# Patient Record
Sex: FEMALE | Race: BLACK OR AFRICAN AMERICAN | NOT HISPANIC OR LATINO | URBAN - METROPOLITAN AREA
[De-identification: names, ages, dates, MRNs, and addresses within clinical notes are randomized per-mention and may not be internally consistent; named-entity substitution may affect disease eponyms.]

---

## 2020-12-23 ENCOUNTER — OFFICE VISIT (OUTPATIENT)
Dept: URBAN - METROPOLITAN AREA CLINIC 70 | Facility: CLINIC | Age: 70
End: 2020-12-23

## 2021-01-11 ENCOUNTER — OFFICE VISIT (OUTPATIENT)
Dept: URBAN - METROPOLITAN AREA CLINIC 70 | Facility: CLINIC | Age: 71
End: 2021-01-11

## 2021-01-21 ENCOUNTER — OFFICE VISIT (OUTPATIENT)
Dept: URBAN - METROPOLITAN AREA CLINIC 70 | Facility: CLINIC | Age: 71
End: 2021-01-21
Payer: MEDICARE

## 2021-01-21 ENCOUNTER — LAB OUTSIDE AN ENCOUNTER (OUTPATIENT)
Dept: URBAN - METROPOLITAN AREA CLINIC 70 | Facility: CLINIC | Age: 71
End: 2021-01-21

## 2021-01-21 ENCOUNTER — WEB ENCOUNTER (OUTPATIENT)
Dept: URBAN - METROPOLITAN AREA CLINIC 70 | Facility: CLINIC | Age: 71
End: 2021-01-21

## 2021-01-21 VITALS
SYSTOLIC BLOOD PRESSURE: 135 MMHG | HEIGHT: 63 IN | HEART RATE: 74 BPM | DIASTOLIC BLOOD PRESSURE: 79 MMHG | TEMPERATURE: 95 F | WEIGHT: 149.4 LBS | BODY MASS INDEX: 26.47 KG/M2

## 2021-01-21 DIAGNOSIS — Z86.010 PERSONAL HISTORY OF COLONIC POLYP: ICD-10-CM

## 2021-01-21 DIAGNOSIS — K21.9 GERD: ICD-10-CM

## 2021-01-21 DIAGNOSIS — R09.89 GLOBUS SENSATION: ICD-10-CM

## 2021-01-21 DIAGNOSIS — R13.10 DYSPHAGIA: ICD-10-CM

## 2021-01-21 PROCEDURE — 1036F TOBACCO NON-USER: CPT | Performed by: NURSE PRACTITIONER

## 2021-01-21 PROCEDURE — 99204 OFFICE O/P NEW MOD 45 MIN: CPT | Performed by: NURSE PRACTITIONER

## 2021-01-21 PROCEDURE — 3017F COLORECTAL CA SCREEN DOC REV: CPT | Performed by: NURSE PRACTITIONER

## 2021-01-21 PROCEDURE — G8420 CALC BMI NORM PARAMETERS: HCPCS | Performed by: NURSE PRACTITIONER

## 2021-01-21 PROCEDURE — G8427 DOCREV CUR MEDS BY ELIG CLIN: HCPCS | Performed by: NURSE PRACTITIONER

## 2021-01-21 RX ORDER — OMEPRAZOLE 20 MG/1
TAKE 1 CAPSULE (20 MG) BY ORAL ROUTE TWICE DAILY BEFORE BREAKFAST AND DINNER CAPSULE, DELAYED RELEASE ORAL 1
Qty: 60 | Refills: 1 | Status: DISCONTINUED | COMMUNITY
Start: 2018-01-29

## 2021-01-21 RX ORDER — PANTOPRAZOLE SODIUM 40 MG/1
1 TABLET TABLET, DELAYED RELEASE ORAL ONCE A DAY
Qty: 90 | Refills: 0 | OUTPATIENT
Start: 2021-01-21

## 2021-01-21 NOTE — HPI-TODAY'S VISIT:
Pt presents today due to trouble with swallowing.  Reports she has sensation of something stuck in her throat all the time. Also states certain foods like bread and rice seem to go down slowly mid-esophagus if she does not chew very small bites.  Denies having to vomit food back up.  Denies dysphagia with liquids. Dysphagia has occurred gradually. Has not been taking an acid reducer. Had an EGD 1/29/18 which found a non-bleeding clean based gastric ulcer, small hiatal hernia, and gastritis.  Pathology was positive for hpylori.  Recommended recall in 8 weeks to eval for healing, but subsequent EGD has not been scheduled to date. Last colonoscopy was 11/15/19 and had poor prep.  Previous colonoscopy prior had poor prep as well. Colonoscopy 11/15/19 found a 12 mm and 5 mm TA as well evidence of prior end to end colo-colonic anastamosis of ascending colon.  Int hemorrhoids and diverticulosis observed.  Pt unsure of why she had bowel surgery.  Denies personal hx of colon cancer.  Pt was to have recall for colonoscopy in 1 yr. Reports mother with hx of colon cancer. Pt denies rectal bleeding, abnormal wt loss, or abdominal pain.

## 2021-01-27 ENCOUNTER — OFFICE VISIT (OUTPATIENT)
Dept: URBAN - METROPOLITAN AREA CLINIC 70 | Facility: CLINIC | Age: 71
End: 2021-01-27

## 2021-01-28 ENCOUNTER — CLAIMS CREATED FROM THE CLAIM WINDOW (OUTPATIENT)
Dept: URBAN - METROPOLITAN AREA CLINIC 4 | Facility: CLINIC | Age: 71
End: 2021-01-28
Payer: MEDICARE

## 2021-01-28 ENCOUNTER — TELEPHONE ENCOUNTER (OUTPATIENT)
Dept: URBAN - METROPOLITAN AREA CLINIC 70 | Facility: CLINIC | Age: 71
End: 2021-01-28

## 2021-01-28 ENCOUNTER — OFFICE VISIT (OUTPATIENT)
Dept: URBAN - METROPOLITAN AREA SURGERY CENTER 24 | Facility: SURGERY CENTER | Age: 71
End: 2021-01-28
Payer: MEDICARE

## 2021-01-28 DIAGNOSIS — K21.00 GASTRO-ESOPHAGEAL REFLUX DISEASE WITH ESOPHAGITIS, WITHOUT BLEEDING: ICD-10-CM

## 2021-01-28 DIAGNOSIS — K31.9 DISEASE OF STOMACH AND DUODENUM, UNSPECIFIED: ICD-10-CM

## 2021-01-28 DIAGNOSIS — K31.89 ACQUIRED DEFORMITY OF DUODENUM: ICD-10-CM

## 2021-01-28 DIAGNOSIS — K21.9 ACID REFLUX: ICD-10-CM

## 2021-01-28 DIAGNOSIS — K31.89 OTHER DISEASES OF STOMACH AND DUODENUM: ICD-10-CM

## 2021-01-28 PROCEDURE — 88312 SPECIAL STAINS GROUP 1: CPT | Performed by: PATHOLOGY

## 2021-01-28 PROCEDURE — G8907 PT DOC NO EVENTS ON DISCHARG: HCPCS | Performed by: INTERNAL MEDICINE

## 2021-01-28 PROCEDURE — 43239 EGD BIOPSY SINGLE/MULTIPLE: CPT | Performed by: INTERNAL MEDICINE

## 2021-01-28 PROCEDURE — 88305 TISSUE EXAM BY PATHOLOGIST: CPT | Performed by: PATHOLOGY

## 2021-01-28 RX ORDER — SUCRALFATE 1 G
1 TABLET ON AN EMPTY STOMACH TABLET ORAL TWICE A DAY
Qty: 60 TABLET | Refills: 0 | OUTPATIENT
Start: 2021-01-28 | End: 2021-02-27

## 2021-01-28 RX ORDER — PANTOPRAZOLE SODIUM 40 MG/1
1 TABLET TABLET, DELAYED RELEASE ORAL TWICE A DAY
Qty: 180 TABLET | Refills: 1 | OUTPATIENT
Start: 2021-01-21

## 2021-01-28 RX ORDER — PANTOPRAZOLE SODIUM 40 MG/1
1 TABLET TABLET, DELAYED RELEASE ORAL ONCE A DAY
Qty: 90 | Refills: 0 | Status: ACTIVE | COMMUNITY
Start: 2021-01-21

## 2021-02-15 ENCOUNTER — OFFICE VISIT (OUTPATIENT)
Dept: URBAN - METROPOLITAN AREA CLINIC 70 | Facility: CLINIC | Age: 71
End: 2021-02-15

## 2021-02-18 ENCOUNTER — OFFICE VISIT (OUTPATIENT)
Dept: URBAN - METROPOLITAN AREA SURGERY CENTER 24 | Facility: SURGERY CENTER | Age: 71
End: 2021-02-18

## 2021-03-08 ENCOUNTER — CLAIMS CREATED FROM THE CLAIM WINDOW (OUTPATIENT)
Dept: URBAN - METROPOLITAN AREA CLINIC 70 | Facility: CLINIC | Age: 71
End: 2021-03-08
Payer: MEDICARE

## 2021-03-08 VITALS
HEIGHT: 63 IN | DIASTOLIC BLOOD PRESSURE: 78 MMHG | TEMPERATURE: 96.2 F | BODY MASS INDEX: 23.71 KG/M2 | HEART RATE: 66 BPM | SYSTOLIC BLOOD PRESSURE: 142 MMHG | WEIGHT: 133.8 LBS

## 2021-03-08 DIAGNOSIS — R13.19 OTHER DYSPHAGIA: ICD-10-CM

## 2021-03-08 DIAGNOSIS — F45.8 GLOBUS SENSATION: ICD-10-CM

## 2021-03-08 PROCEDURE — 99215 OFFICE O/P EST HI 40 MIN: CPT | Performed by: NURSE PRACTITIONER

## 2021-03-08 RX ORDER — PANTOPRAZOLE SODIUM 40 MG/1
1 TABLET TABLET, DELAYED RELEASE ORAL TWICE A DAY
Qty: 180 TABLET | Refills: 1 | Status: DISCONTINUED | COMMUNITY
Start: 2021-01-21

## 2021-03-08 NOTE — HPI-TODAY'S VISIT:
Pt was here for her office visit s/p EGD as described below.  Pt was in hallway and started to fall over backwards per Radha Reynoso MA.  This provider did not witness this event  as I was in a different exam room at the time.  Per the MA, she caught the pt and lowered her to the floor.  Pt therefore did not hit her head and did not lose consciousness.  Pt observed by this provider laying on the floor talking normally without any signs of respiratory distress.  Pt repeated several times that she "keeps choking" and can't eat.  EMS was called to transport pt to ED for further evaluation.  Bp, HR, and O2 sat wnl.  Pt described coherently how she has not been able to eat or drink much lately and does not feel well. Pt's spouse retrieved from his car and brought to pt's side in office and explained to him what happened.  Spouse states pt has been able to swallow without vomiting food but that she feels like the food has a hard time going down.  States she does not eat much lately. Pt was sat up in wheelchair without problem as she continued to describe her concerns. EMS arrived for transportation of pt to ED.  Spouse at pt's side. Notified Dr Alcala as well of above incident.   Pt presents today as a f/u after EGD for dysphagia. EGD on 1/28/21 was negative for a stricture.  Found small hiatal hernia, gastritis, and granular mucoa in cardia. 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 Pt to take Protonix 40 mg po bid and Carafate 1 gm po bid. Recommended repeat EGD in 1 yr. Day of the procedure, pt called afterwards stating she felt like she was choking. Dr Alcala was notified, but pt did not have dilation and no complications during procedure. Felt that sensation was due to globus sensation. ENT to be consulted at this F/U OV.   OF NOTE LOV:  Pt presents today due to trouble with swallowing.  Reports she has sensation of something stuck in her throat all the time. Also states certain foods like bread and rice seem to go down slowly mid-esophagus if she does not chew very small bites.  Denies having to vomit food back up.  Denies dysphagia with liquids. Dysphagia has occurred gradually. Has not been taking an acid reducer. Had an EGD 1/29/18 which found a non-bleeding clean based gastric ulcer, small hiatal hernia, and gastritis.  Pathology was positive for hpylori.  Recommended recall in 8 weeks to eval for healing, but subsequent EGD has not been scheduled to date. Last colonoscopy was 11/15/19 and had poor prep.  Previous colonoscopy prior had poor prep as well. Colonoscopy 11/15/19 found a 12 mm and 5 mm TA as well evidence of prior end to end colo-colonic anastamosis of ascending colon.  Int hemorrhoids and diverticulosis observed.  Pt unsure of why she had bowel surgery.  Denies personal hx of colon cancer.  Pt was to have recall for colonoscopy in 1 yr. Reports mother with hx of colon cancer. Pt denies rectal bleeding, abnormal wt loss, or abdominal pain.

## 2021-04-06 ENCOUNTER — OFFICE VISIT (OUTPATIENT)
Dept: URBAN - METROPOLITAN AREA CLINIC 94 | Facility: CLINIC | Age: 71
End: 2021-04-06
Payer: MEDICARE

## 2021-04-06 VITALS
HEIGHT: 63 IN | BODY MASS INDEX: 22.68 KG/M2 | HEART RATE: 60 BPM | WEIGHT: 128 LBS | SYSTOLIC BLOOD PRESSURE: 127 MMHG | TEMPERATURE: 97.9 F | DIASTOLIC BLOOD PRESSURE: 75 MMHG

## 2021-04-06 DIAGNOSIS — R09.89 GLOBUS SENSATION: ICD-10-CM

## 2021-04-06 PROCEDURE — 99214 OFFICE O/P EST MOD 30 MIN: CPT | Performed by: INTERNAL MEDICINE

## 2021-04-06 RX ORDER — ALUMINUM HYDROXIDE AND MAGNESIUM CARBONATE 95; 358 MG/15ML; MG/15ML
15 ML AFTER MEALS AND AT BEDTIME AS NEEDED LIQUID ORAL
Qty: 1800 ML | Refills: 2 | OUTPATIENT
Start: 2021-04-06 | End: 2021-07-05

## 2021-04-06 RX ORDER — OMEPRAZOLE 40 MG/1
1 CAPSULE 30 MINUTES BEFORE MORNING MEAL CAPSULE, DELAYED RELEASE ORAL ONCE A DAY
Qty: 60 | Refills: 2 | OUTPATIENT
Start: 2021-04-06

## 2021-04-06 NOTE — PHYSICAL EXAM SKIN:
no rashes , no suspicious lesions , no areas of discoloration , no jaundice present , good turgor , no masses , no tenderness on palpation Statement Selected

## 2021-04-06 NOTE — HPI-TODAY'S VISIT:
71 y/o F with hx of H.pylori s/p treatment here for globus sensation  Patient reports for the last 3 months has had constant upper throat(more on the right side of the throat) sensation of tightness as though something is stuck there, worse with solid foods, enough that she is avoiding solid food. Underwent recent EGD 01/2021 without any esophageal abnormality. Is only taking carafate. Was seen at [facility] office last month  Denies any abdominal pain, nausea/vomiting, regurgitation  EGD 01/2021 with Dr Alcala: irregular Z line, small hiatal hernia, gastric erythema. Biopsies with reflux type changes at [facility](no [facility]), focal intestinal metaplasia in cardia

## 2021-04-09 PROBLEM — 14760008 CONSTIPATION: Status: ACTIVE | Noted: 2021-04-09

## 2021-04-09 PROBLEM — 428283002 HISTORY OF POLYP OF COLON: Status: ACTIVE | Noted: 2021-01-21

## 2021-04-09 PROBLEM — 40890009 ESOPHAGEAL DYSPHAGIA: Status: ACTIVE | Noted: 2021-04-09

## 2021-04-14 ENCOUNTER — OFFICE VISIT (OUTPATIENT)
Dept: URBAN - METROPOLITAN AREA MEDICAL CENTER 28 | Facility: MEDICAL CENTER | Age: 71
End: 2021-04-14

## 2021-05-04 ENCOUNTER — OFFICE VISIT (OUTPATIENT)
Dept: URBAN - METROPOLITAN AREA CLINIC 94 | Facility: CLINIC | Age: 71
End: 2021-05-04

## 2021-05-04 RX ORDER — OMEPRAZOLE 40 MG/1
1 CAPSULE 30 MINUTES BEFORE MORNING MEAL CAPSULE, DELAYED RELEASE ORAL ONCE A DAY
Qty: 60 | Refills: 2 | Status: ACTIVE | COMMUNITY
Start: 2021-04-06

## 2021-05-04 RX ORDER — ALUMINUM HYDROXIDE AND MAGNESIUM CARBONATE 95; 358 MG/15ML; MG/15ML
15 ML AFTER MEALS AND AT BEDTIME AS NEEDED LIQUID ORAL
Qty: 1800 ML | Refills: 2 | Status: ACTIVE | COMMUNITY
Start: 2021-04-06 | End: 2021-07-05

## 2021-05-17 ENCOUNTER — OFFICE VISIT (OUTPATIENT)
Dept: URBAN - METROPOLITAN AREA CLINIC 94 | Facility: CLINIC | Age: 71
End: 2021-05-17
Payer: MEDICARE

## 2021-05-17 VITALS
TEMPERATURE: 98.1 F | HEART RATE: 67 BPM | BODY MASS INDEX: 20.91 KG/M2 | HEIGHT: 63 IN | SYSTOLIC BLOOD PRESSURE: 112 MMHG | DIASTOLIC BLOOD PRESSURE: 63 MMHG | WEIGHT: 118 LBS

## 2021-05-17 DIAGNOSIS — R09.89 GLOBUS SENSATION: ICD-10-CM

## 2021-05-17 PROCEDURE — 99214 OFFICE O/P EST MOD 30 MIN: CPT | Performed by: INTERNAL MEDICINE

## 2021-05-17 RX ORDER — OMEPRAZOLE 40 MG/1
1 CAPSULE 30 MINUTES BEFORE MORNING MEAL CAPSULE, DELAYED RELEASE ORAL ONCE A DAY
Qty: 60 | Refills: 2 | OUTPATIENT

## 2021-05-17 RX ORDER — ALUMINUM HYDROXIDE AND MAGNESIUM CARBONATE 95; 358 MG/15ML; MG/15ML
15 ML AFTER MEALS AND AT BEDTIME AS NEEDED LIQUID ORAL
Qty: 1800 ML | Refills: 2 | Status: ACTIVE | COMMUNITY
Start: 2021-04-06 | End: 2021-07-05

## 2021-05-17 RX ORDER — OMEPRAZOLE 40 MG/1
1 CAPSULE 30 MINUTES BEFORE MORNING MEAL CAPSULE, DELAYED RELEASE ORAL ONCE A DAY
Qty: 60 | Refills: 2 | Status: ACTIVE | COMMUNITY
Start: 2021-04-06

## 2021-05-17 NOTE — HPI-TODAY'S VISIT:
69 y/o F with hx of H.pylori s/p treatment here for globus sensation  Patient reports since this year has had constant upper throat(more on the right side of the throat) sensation of tightness as though something is stuck there, worse with solid foods, enough that she is avoiding solid food like meats. Underwent recent EGD 01/2021 without any esophageal abnormality. CT neck 04/2021 unremarkable  Today, reports symptoms a better since she started taking this gaviscon like antacid. Did not get manometry test as they required COVID testing  Denies any abdominal pain, nausea/vomiting, regurgitation  EGD 01/2021 with Dr Alcala: irregular Z line, small hiatal hernia, gastric erythema. Biopsies with reflux type changes at [facility](no [facility]), focal intestinal metaplasia in cardia

## 2021-05-21 PROBLEM — 40739000 DYSPHAGIA: Status: ACTIVE | Noted: 2021-01-21

## 2021-05-28 ENCOUNTER — ERX REFILL RESPONSE (OUTPATIENT)
Dept: URBAN - METROPOLITAN AREA CLINIC 70 | Facility: CLINIC | Age: 71
End: 2021-05-28

## 2021-05-28 RX ORDER — SUCRALFATE 1 G/1
1 TABLET ON AN EMPTY STOMACH TABLET ORAL TWICE A DAY
Qty: 60 | Refills: 0

## 2021-05-29 ENCOUNTER — ERX REFILL RESPONSE (OUTPATIENT)
Dept: URBAN - METROPOLITAN AREA CLINIC 70 | Facility: CLINIC | Age: 71
End: 2021-05-29

## 2021-05-29 RX ORDER — SUCRALFATE 1 G/1
1 TABLET ON AN EMPTY STOMACH TABLET ORAL TWICE A DAY
Qty: 60 | Refills: 0

## 2021-06-07 ENCOUNTER — OFFICE VISIT (OUTPATIENT)
Dept: URBAN - METROPOLITAN AREA MEDICAL CENTER 28 | Facility: MEDICAL CENTER | Age: 71
End: 2021-06-07

## 2021-07-07 ENCOUNTER — LAB OUTSIDE AN ENCOUNTER (OUTPATIENT)
Dept: URBAN - METROPOLITAN AREA CLINIC 70 | Facility: CLINIC | Age: 71
End: 2021-07-07

## 2021-07-07 ENCOUNTER — OFFICE VISIT (OUTPATIENT)
Dept: URBAN - METROPOLITAN AREA CLINIC 70 | Facility: CLINIC | Age: 71
End: 2021-07-07
Payer: MEDICARE

## 2021-07-07 DIAGNOSIS — R13.19 OTHER DYSPHAGIA: ICD-10-CM

## 2021-07-07 DIAGNOSIS — R63.4 ABNORMAL WEIGHT LOSS: ICD-10-CM

## 2021-07-07 DIAGNOSIS — K21.9 GERD WITHOUT ESOPHAGITIS: ICD-10-CM

## 2021-07-07 PROBLEM — 266435005: Status: ACTIVE | Noted: 2021-07-07

## 2021-07-07 PROCEDURE — 99214 OFFICE O/P EST MOD 30 MIN: CPT | Performed by: NURSE PRACTITIONER

## 2021-07-07 RX ORDER — SUCRALFATE 1 G/1
1 TABLET ON AN EMPTY STOMACH TABLET ORAL TWICE A DAY
Qty: 60 | Refills: 0 | Status: ACTIVE | COMMUNITY

## 2021-07-07 RX ORDER — ALPRAZOLAM 0.5 MG/1
TABLET ORAL
Qty: 14 | Status: ACTIVE | COMMUNITY

## 2021-07-07 RX ORDER — OMEPRAZOLE 40 MG/1
1 CAPSULE 30 MINUTES BEFORE MORNING MEAL CAPSULE, DELAYED RELEASE ORAL ONCE A DAY
Qty: 60 | Refills: 2 | Status: ACTIVE | COMMUNITY

## 2021-07-07 RX ORDER — SUCRALFATE 1 G/10ML
10 ML ON AN EMPTY STOMACH SUSPENSION ORAL
Qty: 1200 ML | Refills: 1 | OUTPATIENT

## 2021-07-07 RX ORDER — OMEPRAZOLE 40 MG/1
1 CAPSULE 30 MINUTES BEFORE MORNING MEAL CAPSULE, DELAYED RELEASE ORAL TWICE DAILY
Qty: 180 | Refills: 2 | OUTPATIENT

## 2021-07-07 RX ORDER — SUCRALFATE 1 G/10ML
SUSPENSION ORAL
Qty: 480 | Status: ACTIVE | COMMUNITY

## 2021-07-07 RX ORDER — DICYCLOMINE HYDROCHLORIDE 20 MG/1
TABLET ORAL
Qty: 90 | Status: ACTIVE | COMMUNITY

## 2021-07-07 NOTE — HPI-TODAY'S VISIT:
Pt presents today for hiatal hernia. Of note, pt did have colonoscopy 11/15/19 with recommended repeat in 1 year due to 12mm and 5mm TA polyps resected and family hx of colon cancer. Today pt reports ongoing sensation of something being in her throat to extent she feels the need to puree her food. Feels reflux come up into her throat.   Also reports 30# weight loss.  She did weight 149# at 01/2021 Ov and now weights 118# today. Denies rectal bleeding or family hx of colon cancer. States she went to [facility] ED recently for difficulty swallowing as well.            -OF NOTE LOV 5/17/21 PER DR CLEARY:  69 y/o F with hx of H.pylori s/p treatment here for globus sensation  Patient reports since this year has had constant upper throat(more on the right side of the throat) sensation of tightness as though something is stuck there, worse with solid foods, enough that she is avoiding solid food like meats. Underwent recent EGD 01/2021 without any esophageal abnormality. CT neck 04/2021 unremarkable  Today, reports symptoms a better since she started taking this gaviscon like antacid. Did not get manometry test as they required COVID testing  Denies any abdominal pain, nausea/vomiting, regurgitation  EGD 01/2021 with Dr Alcala: irregular Z line, small hiatal hernia, gastric erythema. Biopsies with reflux type changes at [facility](no [facility]), focal intestinal metaplasia in cardia

## 2021-09-14 ENCOUNTER — TELEPHONE ENCOUNTER (OUTPATIENT)
Dept: URBAN - METROPOLITAN AREA CLINIC 70 | Facility: CLINIC | Age: 71
End: 2021-09-14

## 2021-10-25 ENCOUNTER — OFFICE VISIT (OUTPATIENT)
Dept: URBAN - METROPOLITAN AREA SURGERY CENTER 24 | Facility: SURGERY CENTER | Age: 71
End: 2021-10-25

## 2021-10-26 ENCOUNTER — OFFICE VISIT (OUTPATIENT)
Dept: URBAN - METROPOLITAN AREA CLINIC 70 | Facility: CLINIC | Age: 71
End: 2021-10-26

## 2021-10-26 RX ORDER — ALPRAZOLAM 0.5 MG/1
TABLET ORAL
Qty: 14 | Status: ACTIVE | COMMUNITY

## 2021-10-26 RX ORDER — SUCRALFATE 1 G/1
1 TABLET ON AN EMPTY STOMACH TABLET ORAL TWICE A DAY
Qty: 60 | Refills: 0 | Status: ACTIVE | COMMUNITY

## 2021-10-26 RX ORDER — DICYCLOMINE HYDROCHLORIDE 20 MG/1
TABLET ORAL
Qty: 90 | Status: ACTIVE | COMMUNITY

## 2021-10-26 RX ORDER — SUCRALFATE 1 G/10ML
10 ML ON AN EMPTY STOMACH SUSPENSION ORAL
Qty: 1200 ML | Refills: 1 | Status: ACTIVE | COMMUNITY

## 2021-10-26 RX ORDER — OMEPRAZOLE 40 MG/1
1 CAPSULE 30 MINUTES BEFORE MORNING MEAL CAPSULE, DELAYED RELEASE ORAL TWICE DAILY
Qty: 180 | Refills: 2 | Status: ACTIVE | COMMUNITY

## 2021-10-26 NOTE — HPI-OTHER HISTORIES
----Last office note from 07/07/2021 by Tayler Schofield NP: Pt presents today for hiatal hernia. Of note, pt did have colonoscopy 11/15/19 with recommended repeat in 1 year due to 12mm and 5mm TA polyps resected and family hx of colon cancer. Today pt reports ongoing sensation of something being in her throat to extent she feels the need to puree her food. Feels reflux come up into her throat.   Also reports 30# weight loss.  She did weight 149# at 01/2021 Ov and now weights 118# today. Denies rectal bleeding or family hx of colon cancer. States she went to [facility] ED recently for difficulty swallowing as well.          -OF NOTE LOV 5/17/21 PER DR CLEARY:  71 y/o F with hx of H.pylori s/p treatment here for globus sensation  Patient reports since this year has had constant upper throat(more on the right side of the throat) sensation of tightness as though something is stuck there, worse with solid foods, enough that she is avoiding solid food like meats. Underwent recent EGD 01/2021 without any esophageal abnormality. CT neck 04/2021 unremarkable  Today, reports symptoms a better since she started taking this gaviscon like antacid. Did not get manometry test as they required COVID testing  Denies any abdominal pain, nausea/vomiting, regurgitation  EGD 01/2021 with Dr Alcala: irregular Z line, small hiatal hernia, gastric erythema. Biopsies with reflux type changes at [facility](no [facility]), focal intestinal metaplasia in cardia

## 2021-11-01 ENCOUNTER — TELEPHONE ENCOUNTER (OUTPATIENT)
Dept: URBAN - METROPOLITAN AREA CLINIC 70 | Facility: CLINIC | Age: 71
End: 2021-11-01

## 2021-11-03 ENCOUNTER — CLAIMS CREATED FROM THE CLAIM WINDOW (OUTPATIENT)
Dept: URBAN - METROPOLITAN AREA CLINIC 4 | Facility: CLINIC | Age: 71
End: 2021-11-03
Payer: MEDICARE

## 2021-11-03 ENCOUNTER — OFFICE VISIT (OUTPATIENT)
Dept: URBAN - METROPOLITAN AREA SURGERY CENTER 24 | Facility: SURGERY CENTER | Age: 71
End: 2021-11-03
Payer: MEDICARE

## 2021-11-03 DIAGNOSIS — D12.0 BENIGN NEOPLASM OF CECUM: ICD-10-CM

## 2021-11-03 DIAGNOSIS — D12.0 ADENOMA OF CECUM: ICD-10-CM

## 2021-11-03 DIAGNOSIS — Z86.010 ADENOMAS PERSONAL HISTORY OF COLONIC POLYPS: ICD-10-CM

## 2021-11-03 PROCEDURE — G8907 PT DOC NO EVENTS ON DISCHARG: HCPCS | Performed by: INTERNAL MEDICINE

## 2021-11-03 PROCEDURE — 88305 TISSUE EXAM BY PATHOLOGIST: CPT | Performed by: PATHOLOGY

## 2021-11-03 PROCEDURE — 45380 COLONOSCOPY AND BIOPSY: CPT | Performed by: INTERNAL MEDICINE

## 2021-11-03 RX ORDER — SUCRALFATE 1 G/1
1 TABLET ON AN EMPTY STOMACH TABLET ORAL TWICE A DAY
Qty: 60 | Refills: 0 | Status: ACTIVE | COMMUNITY

## 2021-11-03 RX ORDER — OMEPRAZOLE 40 MG/1
1 CAPSULE 30 MINUTES BEFORE MORNING MEAL CAPSULE, DELAYED RELEASE ORAL TWICE DAILY
Qty: 180 | Refills: 2 | Status: ACTIVE | COMMUNITY

## 2021-11-03 RX ORDER — ALPRAZOLAM 0.5 MG/1
TABLET ORAL
Qty: 14 | Status: ACTIVE | COMMUNITY

## 2021-11-03 RX ORDER — SUCRALFATE 1 G/10ML
10 ML ON AN EMPTY STOMACH SUSPENSION ORAL
Qty: 1200 ML | Refills: 1 | Status: ACTIVE | COMMUNITY

## 2021-11-03 RX ORDER — DICYCLOMINE HYDROCHLORIDE 20 MG/1
TABLET ORAL
Qty: 90 | Status: ACTIVE | COMMUNITY

## 2022-07-26 ENCOUNTER — OFFICE VISIT (OUTPATIENT)
Dept: URBAN - METROPOLITAN AREA CLINIC 70 | Facility: CLINIC | Age: 72
End: 2022-07-26

## 2022-07-26 RX ORDER — SUCRALFATE 1 G/1
1 TABLET ON AN EMPTY STOMACH TABLET ORAL TWICE A DAY
Qty: 60 | Refills: 0 | Status: ACTIVE | COMMUNITY

## 2022-07-26 RX ORDER — ALPRAZOLAM 0.5 MG/1
TABLET ORAL
Qty: 14 | Status: ACTIVE | COMMUNITY

## 2022-07-26 RX ORDER — DICYCLOMINE HYDROCHLORIDE 20 MG/1
TABLET ORAL
Qty: 90 | Status: ACTIVE | COMMUNITY

## 2022-07-26 RX ORDER — SUCRALFATE 1 G/10ML
10 ML ON AN EMPTY STOMACH SUSPENSION ORAL
Qty: 1200 ML | Refills: 1 | Status: ACTIVE | COMMUNITY

## 2022-07-26 RX ORDER — OMEPRAZOLE 40 MG/1
1 CAPSULE 30 MINUTES BEFORE MORNING MEAL CAPSULE, DELAYED RELEASE ORAL TWICE DAILY
Qty: 180 | Refills: 2 | Status: ACTIVE | COMMUNITY

## 2022-08-25 ENCOUNTER — LAB OUTSIDE AN ENCOUNTER (OUTPATIENT)
Dept: URBAN - METROPOLITAN AREA CLINIC 70 | Facility: CLINIC | Age: 72
End: 2022-08-25

## 2022-08-25 ENCOUNTER — OFFICE VISIT (OUTPATIENT)
Dept: URBAN - METROPOLITAN AREA CLINIC 70 | Facility: CLINIC | Age: 72
End: 2022-08-25
Payer: MEDICARE

## 2022-08-25 VITALS
HEIGHT: 63 IN | SYSTOLIC BLOOD PRESSURE: 120 MMHG | HEART RATE: 59 BPM | WEIGHT: 108.9 LBS | BODY MASS INDEX: 19.3 KG/M2 | TEMPERATURE: 97.2 F | DIASTOLIC BLOOD PRESSURE: 69 MMHG

## 2022-08-25 DIAGNOSIS — R13.19 OTHER DYSPHAGIA: ICD-10-CM

## 2022-08-25 DIAGNOSIS — R63.4 WEIGHT LOSS: ICD-10-CM

## 2022-08-25 DIAGNOSIS — K21.00 GASTROESOPHAGEAL REFLUX DISEASE WITH ESOPHAGITIS WITHOUT HEMORRHAGE: ICD-10-CM

## 2022-08-25 PROCEDURE — 99204 OFFICE O/P NEW MOD 45 MIN: CPT | Performed by: NURSE PRACTITIONER

## 2022-08-25 RX ORDER — ALPRAZOLAM 0.5 MG/1
TABLET ORAL
Qty: 14 | Status: ACTIVE | COMMUNITY

## 2022-08-25 RX ORDER — OMEPRAZOLE 20 MG/1
TAKE 1 CAPSULE TABLET, DELAYED RELEASE ORAL TWICE A DAY
Qty: 180 | Refills: 1

## 2022-08-25 RX ORDER — SUCRALFATE 1 G/1
1 TABLET ON AN EMPTY STOMACH TABLET ORAL TWICE A DAY
Qty: 60 | Refills: 0 | Status: DISCONTINUED | COMMUNITY

## 2022-08-25 RX ORDER — OMEPRAZOLE 40 MG/1
1 CAPSULE 30 MINUTES BEFORE MORNING MEAL CAPSULE, DELAYED RELEASE ORAL TWICE DAILY
Qty: 180 | Refills: 2 | Status: ACTIVE | COMMUNITY

## 2022-08-25 RX ORDER — SUCRALFATE 1 G/10ML
10 ML ON AN EMPTY STOMACH SUSPENSION ORAL
Qty: 1200 ML | Refills: 1 | Status: ACTIVE | COMMUNITY

## 2022-08-25 RX ORDER — DICYCLOMINE HYDROCHLORIDE 20 MG/1
TABLET ORAL
Qty: 90 | Status: DISCONTINUED | COMMUNITY

## 2022-08-25 NOTE — PHYSICAL EXAM SKIN:
Called pt and gave KTS recommendations above. Pt verbalizes understanding and agreeable to plan.     no rashes,  no suspicious lesions,  no areas of discoloration,  no jaundice present

## 2022-08-25 NOTE — HPI-TODAY'S VISIT:
Patient presents today, along with her , for evaluation of dysphagia.  Dysphagia has been an issue for several years.  Describes it as sensation of solids/pills becoming lodged in upper throat.  She states that she feels as though "food is pushing up in my throat" after eating.  Spouse states she "nibbles" on her meals due to c/o dysphagia.  Has lost about 41 pounds since January 2021.  She was last evaluated by Dr. Cantrell in [place], [place] who performed EGD with PEG placement 4 months ago.  Spouse states she receives 2 bolus feedings a day.  Patient continues to try to take tablets/capsules by mouth which leads to dysphagia sensation.  Spouse states she  has been on pureed diet and able to tolerate this.  Denies recent evaluation by speech therapy, hx of CVA, brain injury or hx of esophageal stricture.  Last EGD by our office for c/o dysphagia was in January 2021:  widely patent esophagus, irregular Z-line, small hiatal hernia, gastritis and normal duodenum.

## 2022-09-01 ENCOUNTER — TELEPHONE ENCOUNTER (OUTPATIENT)
Dept: URBAN - METROPOLITAN AREA CLINIC 70 | Facility: CLINIC | Age: 72
End: 2022-09-01

## 2022-09-08 LAB
A/G RATIO: 1.3
ALBUMIN: 4.4
ALKALINE PHOSPHATASE: 76
ALT (SGPT): 15
AST (SGOT): 18
BILIRUBIN, TOTAL: 1
BUN/CREATININE RATIO: 11
BUN: 15
CALCIUM: 10.5
CARBON DIOXIDE, TOTAL: 30
CHLORIDE: 101
CREATININE: 1.32
EGFR: 43
GLOBULIN, TOTAL: 3.4
GLUCOSE: 80
HEMATOCRIT: 36.2
HEMOGLOBIN: 12.2
MCH: 26.4
MCHC: 33.7
MCV: 78.4
MPV: 12.5
PLATELET COUNT: 147
POTASSIUM: 4.2
PROTEIN, TOTAL: 7.8
RDW: 14.4
RED BLOOD CELL COUNT: 4.62
SODIUM: 141
TSH W/REFLEX TO FT4: 0.8
WHITE BLOOD CELL COUNT: 3.9

## 2022-09-22 ENCOUNTER — TELEPHONE ENCOUNTER (OUTPATIENT)
Dept: URBAN - METROPOLITAN AREA CLINIC 70 | Facility: CLINIC | Age: 72
End: 2022-09-22

## 2022-09-26 ENCOUNTER — OFFICE VISIT (OUTPATIENT)
Dept: URBAN - METROPOLITAN AREA CLINIC 70 | Facility: CLINIC | Age: 72
End: 2022-09-26

## 2022-10-12 ENCOUNTER — OFFICE VISIT (OUTPATIENT)
Dept: URBAN - METROPOLITAN AREA CLINIC 70 | Facility: CLINIC | Age: 72
End: 2022-10-12

## 2022-10-12 RX ORDER — SUCRALFATE 1 G/10ML
10 ML ON AN EMPTY STOMACH SUSPENSION ORAL
Qty: 1200 ML | Refills: 1 | Status: ACTIVE | COMMUNITY

## 2022-10-12 RX ORDER — OMEPRAZOLE 20 MG/1
TAKE 1 CAPSULE TABLET, DELAYED RELEASE ORAL TWICE A DAY
Qty: 180 | Refills: 1

## 2022-10-12 RX ORDER — ALPRAZOLAM 0.5 MG/1
TABLET ORAL
Qty: 14 | Status: ACTIVE | COMMUNITY

## 2022-10-12 RX ORDER — OMEPRAZOLE 20 MG/1
TAKE 1 CAPSULE TABLET, DELAYED RELEASE ORAL TWICE A DAY
Qty: 180 | Refills: 1 | Status: ACTIVE | COMMUNITY

## 2022-10-12 NOTE — HPI-OTHER HISTORIES
--------------------------------------------------------------- Last office note 08/25/2022: Patient presents today, along with her , for evaluation of dysphagia.  Dysphagia has been an issue for several years.  Describes it as sensation of solids/pills becoming lodged in upper throat.  She states that she feels as though "food is pushing up in my throat" after eating.  Spouse states she "nibbles" on her meals due to c/o dysphagia.  Has lost about 41 pounds since January 2021.  She was last evaluated by Dr. Cantrell in [place], [place] who performed EGD with PEG placement 4 months ago.  Spouse states she receives 2 bolus feedings a day.  Patient continues to try to take tablets/capsules by mouth which leads to dysphagia sensation.  Spouse states she  has been on pureed diet and able to tolerate this.  Denies recent evaluation by speech therapy, hx of CVA, brain injury or hx of esophageal stricture.  Last EGD by our office for c/o dysphagia was in January 2021:  widely patent esophagus, irregular Z-line, small hiatal hernia, gastritis and normal duodenum.

## 2022-10-14 ENCOUNTER — LAB OUTSIDE AN ENCOUNTER (OUTPATIENT)
Dept: URBAN - METROPOLITAN AREA CLINIC 70 | Facility: CLINIC | Age: 72
End: 2022-10-14

## 2022-10-14 ENCOUNTER — OFFICE VISIT (OUTPATIENT)
Dept: URBAN - METROPOLITAN AREA CLINIC 70 | Facility: CLINIC | Age: 72
End: 2022-10-14
Payer: MEDICARE

## 2022-10-14 VITALS
HEIGHT: 63 IN | DIASTOLIC BLOOD PRESSURE: 69 MMHG | HEART RATE: 74 BPM | WEIGHT: 105.8 LBS | TEMPERATURE: 97.6 F | SYSTOLIC BLOOD PRESSURE: 117 MMHG | BODY MASS INDEX: 18.75 KG/M2

## 2022-10-14 DIAGNOSIS — R13.19 OTHER DYSPHAGIA: ICD-10-CM

## 2022-10-14 DIAGNOSIS — D50.8 OTHER IRON DEFICIENCY ANEMIA: ICD-10-CM

## 2022-10-14 DIAGNOSIS — R63.4 WEIGHT LOSS: ICD-10-CM

## 2022-10-14 DIAGNOSIS — K21.00 GASTROESOPHAGEAL REFLUX DISEASE WITH ESOPHAGITIS WITHOUT HEMORRHAGE: ICD-10-CM

## 2022-10-14 PROBLEM — 87522002: Status: ACTIVE | Noted: 2022-10-14

## 2022-10-14 PROCEDURE — 99214 OFFICE O/P EST MOD 30 MIN: CPT | Performed by: NURSE PRACTITIONER

## 2022-10-14 PROCEDURE — 99215 OFFICE O/P EST HI 40 MIN: CPT | Performed by: NURSE PRACTITIONER

## 2022-10-14 RX ORDER — ALPRAZOLAM 0.5 MG/1
TABLET ORAL
Qty: 14 | Status: ON HOLD | COMMUNITY

## 2022-10-14 RX ORDER — SUCRALFATE 1 G/10ML
10 ML ON AN EMPTY STOMACH SUSPENSION ORAL
Qty: 1200 ML | Refills: 1 | Status: ON HOLD | COMMUNITY

## 2022-10-14 RX ORDER — OMEPRAZOLE 40 MG/1
TAKE 1 CAPSULE CAPSULE, DELAYED RELEASE ORAL TWICE A DAY
Qty: 180 | Refills: 1

## 2022-10-14 RX ORDER — OMEPRAZOLE 20 MG/1
TAKE 1 CAPSULE TABLET, DELAYED RELEASE ORAL TWICE A DAY
Qty: 180 | Refills: 1 | Status: ACTIVE | COMMUNITY

## 2022-10-14 NOTE — PHYSICAL EXAM GASTROINTESTINAL
Abdomen , soft, nontender, nondistended , no guarding or rigidity , no masses palpable , normal bowel sounds , healed surgical incision noted to RLQ with firmness noted to scar.  No pain voiced with palpation over this area.  Liver and Spleen: no hepatosplenomegaly

## 2022-10-14 NOTE — HPI-OTHER HISTORIES
--------------------------------------------------------------- Last office note 08/25/2022: Patient presents today, along with her , for evaluation of dysphagia.  Dysphagia has been an issue for several years.  Describes it as sensation of solids/pills becoming lodged in upper throat.  She states that she feels as though "food is pushing up in my throat" after eating.  Spouse states she "nibbles" on her meals due to c/o dysphagia.  Has lost about 41 pounds since January 2021.  She was last evaluated by Dr. Cantrell in [place], [place] who performed EGD with PEG placement 4 months ago.  Spouse states she receives 2 bolus feedings a day.  Patient continues to try to take tablets/capsules by mouth which leads to dysphagia sensation.  Spouse states she  has been on pureed diet and able to tolerate this.  Denies recent evaluation by speech therapy, hx of CVA, brain injury or hx of esophageal stricture.  Last EGD by our office for c/o dysphagia was in January 2021:  widely patent esophagus, irregular Z-line, small hiatal hernia, gastritis and normal duodenum.

## 2022-10-14 NOTE — HPI-TODAY'S VISIT:
Patient presents with c/o dysphagia to solids that has been occurring for over a year.  Patient was last seen on 08/25/2022 due to similar complaints.  Prior to this visit she has EGD with PEG placed by Dr. Cantrell in April 2022.  Records were requested after LOV but have not been received as of this visit.  She is unsure as to the reason PEG was placed.  Modified Barium Swallow was ordered after LOV but has not been completed by patient.  Patient and denies recent evaluation by speech therapy, hx of CVA, brain injury or hx of esophageal stricture.   She continues to use PEG tube with bolus feedings administered BID.  Eating solids leads to sensation of food becoming lodged or being "pushed up" in upper throat.  She denies having to induce vomiting after meals, pain with swallowing or signs of bleeding.  Taking TUMS as needed helps to improve this sensation.  She has also switched to softer foods.  Currently, on omeprazole 20 mg BID via PEG.  Last EGD by our office for c/o dysphagia was in January 2021:  widely patent esophagus, irregular Z-line, small hiatal hernia, gastritis and normal duodenum.  Also c/o "knot sensation" to RL abdomen.  States knot has graciela present "a long time" and has not grown in size.  Denies pain to this region and states was informed of this being a hernia.  She continues to have weight loss over the last few office visits.  CT A/P was ordered after LOV but not completed.  Labs reviewed and discussed with patient. Last colonoscopy was in November 2018: 3 mm adenoma polyp, sigmoid diverticulosis, prior end-to-end colo-colonic anastomosis in ascending colon.  Five year surveillance recommended.

## 2022-10-15 PROBLEM — 266433003: Status: ACTIVE | Noted: 2022-08-25

## 2022-11-21 ENCOUNTER — OFFICE VISIT (OUTPATIENT)
Dept: URBAN - METROPOLITAN AREA SURGERY CENTER 24 | Facility: SURGERY CENTER | Age: 72
End: 2022-11-21

## 2023-02-06 ENCOUNTER — DASHBOARD ENCOUNTERS (OUTPATIENT)
Age: 73
End: 2023-02-06

## 2023-02-06 ENCOUNTER — LAB OUTSIDE AN ENCOUNTER (OUTPATIENT)
Dept: URBAN - METROPOLITAN AREA CLINIC 70 | Facility: CLINIC | Age: 73
End: 2023-02-06

## 2023-02-06 ENCOUNTER — OFFICE VISIT (OUTPATIENT)
Dept: URBAN - METROPOLITAN AREA CLINIC 70 | Facility: CLINIC | Age: 73
End: 2023-02-06
Payer: MEDICARE

## 2023-02-06 ENCOUNTER — OFFICE VISIT (OUTPATIENT)
Dept: URBAN - METROPOLITAN AREA CLINIC 88 | Facility: CLINIC | Age: 73
End: 2023-02-06

## 2023-02-06 VITALS
HEART RATE: 62 BPM | TEMPERATURE: 96.4 F | WEIGHT: 116.3 LBS | SYSTOLIC BLOOD PRESSURE: 103 MMHG | BODY MASS INDEX: 20.61 KG/M2 | HEIGHT: 63 IN | DIASTOLIC BLOOD PRESSURE: 62 MMHG

## 2023-02-06 DIAGNOSIS — R13.10 DYSPHAGIA, UNSPECIFIED TYPE: ICD-10-CM

## 2023-02-06 DIAGNOSIS — R06.02 SHORTNESS OF BREATH: ICD-10-CM

## 2023-02-06 PROBLEM — 40739000: Status: ACTIVE | Noted: 2023-02-06

## 2023-02-06 PROCEDURE — 99214 OFFICE O/P EST MOD 30 MIN: CPT

## 2023-02-06 RX ORDER — SUCRALFATE 1 G/10ML
10 ML ON AN EMPTY STOMACH SUSPENSION ORAL
Qty: 1200 ML | Refills: 1 | Status: DISCONTINUED | COMMUNITY

## 2023-02-06 RX ORDER — PANTOPRAZOLE SODIUM 40 MG/1
TAKE 1 TABLET IN THE MORNING ON AN EMPTY STOMACH, WAIT 30 MINUTES, THEN START EATING TABLET, DELAYED RELEASE ORAL ONCE A DAY
Qty: 90 | Refills: 3 | OUTPATIENT
Start: 2023-02-06

## 2023-02-06 RX ORDER — ALPRAZOLAM 0.5 MG/1
TABLET ORAL
Qty: 14 | Status: DISCONTINUED | COMMUNITY

## 2023-02-06 RX ORDER — OMEPRAZOLE 40 MG/1
TAKE 1 CAPSULE CAPSULE, DELAYED RELEASE ORAL TWICE A DAY
Qty: 180 | Refills: 1 | Status: DISCONTINUED | COMMUNITY

## 2023-02-06 NOTE — HPI-TODAY'S VISIT:
The pt presents for dysphagia and shortness of breath.  She states symptoms have been present for many years. She was previously seen in our office last year for dysphagia.  At the time an EGD was ordered, but she did not show up for the appt.  She states since her LOV, her PEG tube which was placed by Dr. Cantrell in April 2022 has been removed.  A scar is present on her abdomen where previous PEG tube was placed, but appears to be healing well without signs of infection.  Modified barium swallow was ordered at previous office visit as well and never completed.  She denies recent evaluation by speech therapy, hx of CVA, brain injury or hx of esophageal stricture.   She admits to indigestion and feelings like food is being "pushed up in her throat."  She is currently not taking a PPI medication.  She states that she is currently eating a soft diet and is cutting her pill medications in half. She has never been evaluated for her shortness of breath.

## 2023-02-06 NOTE — HPI-OTHER HISTORIES
OV 10/14/2022 genaro Hodge: Patient presents with c/o dysphagia to solids that has been occurring for over a year.  Patient was last seen on 08/25/2022 due to similar complaints.  Prior to this visit she has EGD with PEG placed by Dr. Cantrell in April 2022.  Records were requested after LOV but have not been received as of this visit.  She is unsure as to the reason PEG was placed.  Modified Barium Swallow was ordered after LOV but has not been completed by patient.  Patient and denies recent evaluation by speech therapy, hx of CVA, brain injury or hx of esophageal stricture.   She continues to use PEG tube with bolus feedings administered BID.  Eating solids leads to sensation of food becoming lodged or being "pushed up" in upper throat.  She denies having to induce vomiting after meals, pain with swallowing or signs of bleeding.  Taking TUMS as needed helps to improve this sensation.  She has also switched to softer foods.  Currently, on omeprazole 20 mg BID via PEG.  Last EGD by our office for c/o dysphagia was in January 2021:  widely patent esophagus, irregular Z-line, small hiatal hernia, gastritis and normal duodenum.  Also c/o "knot sensation" to RL abdomen.  States knot has graciela present "a long time" and has not grown in size.  Denies pain to this region and states was informed of this being a hernia.  She continues to have weight loss over the last few office visits.  CT A/P was ordered after LOV but not completed.  Labs reviewed and discussed with patient. Last colonoscopy was in November 2018: 3 mm adenoma polyp, sigmoid diverticulosis, prior end-to-end colo-colonic anastomosis in ascending colon.  Five year surveillance recommended. 1-2 cups/cans per day

## 2024-07-09 ENCOUNTER — OFFICE VISIT (OUTPATIENT)
Dept: URBAN - METROPOLITAN AREA CLINIC 70 | Facility: CLINIC | Age: 74
End: 2024-07-09